# Patient Record
Sex: FEMALE | Race: OTHER | HISPANIC OR LATINO | URBAN - METROPOLITAN AREA
[De-identification: names, ages, dates, MRNs, and addresses within clinical notes are randomized per-mention and may not be internally consistent; named-entity substitution may affect disease eponyms.]

---

## 2024-05-05 ENCOUNTER — EMERGENCY (EMERGENCY)
Facility: HOSPITAL | Age: 44
LOS: 1 days | Discharge: HOME | End: 2024-05-05
Attending: EMERGENCY MEDICINE
Payer: COMMERCIAL

## 2024-05-05 VITALS
RESPIRATION RATE: 20 BRPM | DIASTOLIC BLOOD PRESSURE: 80 MMHG | OXYGEN SATURATION: 100 % | WEIGHT: 127.87 LBS | HEART RATE: 80 BPM | TEMPERATURE: 98 F | SYSTOLIC BLOOD PRESSURE: 138 MMHG

## 2024-05-05 PROCEDURE — 71046 X-RAY EXAM CHEST 2 VIEWS: CPT | Mod: 26

## 2024-05-05 PROCEDURE — 99284 EMERGENCY DEPT VISIT MOD MDM: CPT

## 2024-05-05 NOTE — ED ADULT TRIAGE NOTE - CHIEF COMPLAINT QUOTE
she had a fall 10 days ago in the bathroom, she has a pain in the right chest and in the middle of chest

## 2024-05-05 NOTE — ED ADULT NURSE NOTE - OBJECTIVE STATEMENT
Patient presents to ED with c/o mid chest pain with movement and SOB with deep breathing s/p fall. Patient reports she fell on 4/25/24, landed on the right side, denies LOC, not on blood thinner. Patient denies difficulty ambulating.

## 2024-05-06 VITALS
DIASTOLIC BLOOD PRESSURE: 71 MMHG | SYSTOLIC BLOOD PRESSURE: 109 MMHG | RESPIRATION RATE: 17 BRPM | OXYGEN SATURATION: 100 % | HEART RATE: 64 BPM | TEMPERATURE: 98 F

## 2024-05-06 LAB
ALBUMIN SERPL ELPH-MCNC: 3.8 G/DL — SIGNIFICANT CHANGE UP (ref 3.5–5)
ALP SERPL-CCNC: 77 U/L — SIGNIFICANT CHANGE UP (ref 40–120)
ALT FLD-CCNC: 19 U/L DA — SIGNIFICANT CHANGE UP (ref 10–60)
ANION GAP SERPL CALC-SCNC: 3 MMOL/L — LOW (ref 5–17)
AST SERPL-CCNC: 12 U/L — SIGNIFICANT CHANGE UP (ref 10–40)
BASOPHILS # BLD AUTO: 0.06 K/UL — SIGNIFICANT CHANGE UP (ref 0–0.2)
BASOPHILS NFR BLD AUTO: 0.6 % — SIGNIFICANT CHANGE UP (ref 0–2)
BILIRUB SERPL-MCNC: 0.4 MG/DL — SIGNIFICANT CHANGE UP (ref 0.2–1.2)
BUN SERPL-MCNC: 16 MG/DL — SIGNIFICANT CHANGE UP (ref 7–18)
CALCIUM SERPL-MCNC: 8.7 MG/DL — SIGNIFICANT CHANGE UP (ref 8.4–10.5)
CHLORIDE SERPL-SCNC: 109 MMOL/L — HIGH (ref 96–108)
CO2 SERPL-SCNC: 28 MMOL/L — SIGNIFICANT CHANGE UP (ref 22–31)
CREAT SERPL-MCNC: 0.64 MG/DL — SIGNIFICANT CHANGE UP (ref 0.5–1.3)
D DIMER BLD IA.RAPID-MCNC: <150 NG/ML DDU — SIGNIFICANT CHANGE UP
EGFR: 111 ML/MIN/1.73M2 — SIGNIFICANT CHANGE UP
EOSINOPHIL # BLD AUTO: 0.24 K/UL — SIGNIFICANT CHANGE UP (ref 0–0.5)
EOSINOPHIL NFR BLD AUTO: 2.6 % — SIGNIFICANT CHANGE UP (ref 0–6)
GLUCOSE SERPL-MCNC: 104 MG/DL — HIGH (ref 70–99)
HCG SERPL-ACNC: <1 MIU/ML — SIGNIFICANT CHANGE UP
HCT VFR BLD CALC: 36.4 % — SIGNIFICANT CHANGE UP (ref 34.5–45)
HGB BLD-MCNC: 12 G/DL — SIGNIFICANT CHANGE UP (ref 11.5–15.5)
IMM GRANULOCYTES NFR BLD AUTO: 0.3 % — SIGNIFICANT CHANGE UP (ref 0–0.9)
LYMPHOCYTES # BLD AUTO: 3.19 K/UL — SIGNIFICANT CHANGE UP (ref 1–3.3)
LYMPHOCYTES # BLD AUTO: 34.4 % — SIGNIFICANT CHANGE UP (ref 13–44)
MCHC RBC-ENTMCNC: 31.4 PG — SIGNIFICANT CHANGE UP (ref 27–34)
MCHC RBC-ENTMCNC: 33 GM/DL — SIGNIFICANT CHANGE UP (ref 32–36)
MCV RBC AUTO: 95.3 FL — SIGNIFICANT CHANGE UP (ref 80–100)
MONOCYTES # BLD AUTO: 0.67 K/UL — SIGNIFICANT CHANGE UP (ref 0–0.9)
MONOCYTES NFR BLD AUTO: 7.2 % — SIGNIFICANT CHANGE UP (ref 2–14)
NEUTROPHILS # BLD AUTO: 5.09 K/UL — SIGNIFICANT CHANGE UP (ref 1.8–7.4)
NEUTROPHILS NFR BLD AUTO: 54.9 % — SIGNIFICANT CHANGE UP (ref 43–77)
NRBC # BLD: 0 /100 WBCS — SIGNIFICANT CHANGE UP (ref 0–0)
PLATELET # BLD AUTO: 250 K/UL — SIGNIFICANT CHANGE UP (ref 150–400)
POTASSIUM SERPL-MCNC: 3.5 MMOL/L — SIGNIFICANT CHANGE UP (ref 3.5–5.3)
POTASSIUM SERPL-SCNC: 3.5 MMOL/L — SIGNIFICANT CHANGE UP (ref 3.5–5.3)
PROT SERPL-MCNC: 7.4 G/DL — SIGNIFICANT CHANGE UP (ref 6–8.3)
RBC # BLD: 3.82 M/UL — SIGNIFICANT CHANGE UP (ref 3.8–5.2)
RBC # FLD: 11.8 % — SIGNIFICANT CHANGE UP (ref 10.3–14.5)
SODIUM SERPL-SCNC: 140 MMOL/L — SIGNIFICANT CHANGE UP (ref 135–145)
TROPONIN I, HIGH SENSITIVITY RESULT: 22.2 NG/L — SIGNIFICANT CHANGE UP
WBC # BLD: 9.28 K/UL — SIGNIFICANT CHANGE UP (ref 3.8–10.5)
WBC # FLD AUTO: 9.28 K/UL — SIGNIFICANT CHANGE UP (ref 3.8–10.5)

## 2024-05-06 PROCEDURE — 96374 THER/PROPH/DIAG INJ IV PUSH: CPT

## 2024-05-06 PROCEDURE — 80053 COMPREHEN METABOLIC PANEL: CPT

## 2024-05-06 PROCEDURE — 84702 CHORIONIC GONADOTROPIN TEST: CPT

## 2024-05-06 PROCEDURE — 99284 EMERGENCY DEPT VISIT MOD MDM: CPT | Mod: 25

## 2024-05-06 PROCEDURE — 71250 CT THORAX DX C-: CPT | Mod: 26,MC

## 2024-05-06 PROCEDURE — 36415 COLL VENOUS BLD VENIPUNCTURE: CPT

## 2024-05-06 PROCEDURE — 85025 COMPLETE CBC W/AUTO DIFF WBC: CPT

## 2024-05-06 PROCEDURE — 85379 FIBRIN DEGRADATION QUANT: CPT

## 2024-05-06 PROCEDURE — 93005 ELECTROCARDIOGRAM TRACING: CPT

## 2024-05-06 PROCEDURE — 71250 CT THORAX DX C-: CPT | Mod: MC

## 2024-05-06 PROCEDURE — 84484 ASSAY OF TROPONIN QUANT: CPT

## 2024-05-06 PROCEDURE — 71046 X-RAY EXAM CHEST 2 VIEWS: CPT

## 2024-05-06 RX ORDER — IBUPROFEN 200 MG
1 TABLET ORAL
Qty: 30 | Refills: 0
Start: 2024-05-06 | End: 2024-05-15

## 2024-05-06 RX ORDER — KETOROLAC TROMETHAMINE 30 MG/ML
30 SYRINGE (ML) INJECTION ONCE
Refills: 0 | Status: DISCONTINUED | OUTPATIENT
Start: 2024-05-06 | End: 2024-05-06

## 2024-05-06 RX ORDER — ACETAMINOPHEN 500 MG
2 TABLET ORAL
Qty: 112 | Refills: 0
Start: 2024-05-06 | End: 2024-05-19

## 2024-05-06 RX ADMIN — Medication 30 MILLIGRAM(S): at 05:13

## 2024-05-06 NOTE — ED PROVIDER NOTE - CLINICAL SUMMARY MEDICAL DECISION MAKING FREE TEXT BOX
CT chest reported no traumatic injuries.  Pt is well appearing, has no new complaints and able to walk with normal gait. Pt is stable for discharge and follow up with medical doctor. Pt educated on care and need for follow up. Discussed anticipatory guidance and return precautions. Questions answered. I had a detailed discussion with the patient regarding the historical points, exam findings, and any diagnostic results supporting the discharge diagnosis.

## 2024-05-06 NOTE — ED PROVIDER NOTE - NSFOLLOWUPINSTRUCTIONS_ED_ALL_ED_FT
Take Motrin 400mg every 6 hrs for pain woith food.    Return to ER immediately if your chest pain returns, if you have pain with radiation to arms, back or neck, if you feel short of breath, feel weak, feel fast or pounding heart beating, if you have any fever or vomiting.  If you need assistance with follow up appointments our Care Coordinator can be reached at [phone]. In addition [facility] is located at [address] [place] [address], tel: [phone].

## 2024-05-06 NOTE — ED PROVIDER NOTE - OBJECTIVE STATEMENT
43-year-old female states 10 days ago she slipped in bathroom and struck right side of her chest wall on bathtub edge.  Patient with persistent tenderness to right side of her chest wall which radiates to the right side of her back.  No head trauma, no LOC, no abdominal pain, no nausea, no vomiting.  Patient is not on any anticoagulants or takes any prescription medications.

## 2024-05-06 NOTE — ED PROVIDER NOTE - PATIENT PORTAL LINK FT
You can access the FollowMyHealth Patient Portal offered by [facility] by registering at the following website: [email or web address]. By joining [employer]’s FollowMyHealth portal, you will also be able to view your health information using other applications (apps) compatible with our system.

## 2024-05-06 NOTE — ED PROVIDER NOTE - NSFOLLOWUPCLINICS_GEN_ALL_ED_FT
[facility]  Internal Medicine  [address] [place] [address]  Phone: [phone]  Fax: [phone]

## 2024-05-06 NOTE — ED PROVIDER NOTE - PHYSICAL EXAMINATION
No distress, patient with right anterior sternal area and right thoracic area tenderness to palpation, no bony deformities, no visible ecchymosis

## 2024-06-12 ENCOUNTER — EMERGENCY (EMERGENCY)
Facility: HOSPITAL | Age: 44
LOS: 1 days | Discharge: HOME | End: 2024-06-12
Attending: EMERGENCY MEDICINE
Payer: COMMERCIAL

## 2024-06-12 VITALS
WEIGHT: 138.89 LBS | RESPIRATION RATE: 16 BRPM | DIASTOLIC BLOOD PRESSURE: 80 MMHG | HEART RATE: 67 BPM | SYSTOLIC BLOOD PRESSURE: 150 MMHG | HEIGHT: 61 IN | OXYGEN SATURATION: 99 % | TEMPERATURE: 98 F

## 2024-06-12 LAB
APPEARANCE UR: CLEAR — SIGNIFICANT CHANGE UP
BILIRUB UR-MCNC: NEGATIVE — SIGNIFICANT CHANGE UP
COLOR SPEC: YELLOW — SIGNIFICANT CHANGE UP
DIFF PNL FLD: NEGATIVE — SIGNIFICANT CHANGE UP
GLUCOSE UR QL: NEGATIVE MG/DL — SIGNIFICANT CHANGE UP
HCG UR QL: NEGATIVE — SIGNIFICANT CHANGE UP
KETONES UR-MCNC: NEGATIVE MG/DL — SIGNIFICANT CHANGE UP
LEUKOCYTE ESTERASE UR-ACNC: NEGATIVE — SIGNIFICANT CHANGE UP
NITRITE UR-MCNC: NEGATIVE — SIGNIFICANT CHANGE UP
PH UR: 6.5 — SIGNIFICANT CHANGE UP (ref 5–8)
PROT UR-MCNC: NEGATIVE MG/DL — SIGNIFICANT CHANGE UP
SP GR SPEC: 1.01 — SIGNIFICANT CHANGE UP (ref 1–1.03)
UROBILINOGEN FLD QL: 0.2 MG/DL — SIGNIFICANT CHANGE UP (ref 0.2–1)

## 2024-06-12 PROCEDURE — 81025 URINE PREGNANCY TEST: CPT

## 2024-06-12 PROCEDURE — 99283 EMERGENCY DEPT VISIT LOW MDM: CPT

## 2024-06-12 PROCEDURE — 81003 URINALYSIS AUTO W/O SCOPE: CPT

## 2024-06-12 PROCEDURE — 99284 EMERGENCY DEPT VISIT MOD MDM: CPT

## 2024-06-12 RX ORDER — IBUPROFEN 200 MG
600 TABLET ORAL ONCE
Refills: 0 | Status: COMPLETED | OUTPATIENT
Start: 2024-06-12 | End: 2024-06-12

## 2024-06-12 RX ORDER — ONDANSETRON 8 MG/1
4 TABLET, FILM COATED ORAL ONCE
Refills: 0 | Status: COMPLETED | OUTPATIENT
Start: 2024-06-12 | End: 2024-06-12

## 2024-06-12 RX ORDER — PHENAZOPYRIDINE HCL 100 MG
200 TABLET ORAL ONCE
Refills: 0 | Status: COMPLETED | OUTPATIENT
Start: 2024-06-12 | End: 2024-06-12

## 2024-06-12 RX ORDER — FLUCONAZOLE 150 MG/1
150 TABLET ORAL ONCE
Refills: 0 | Status: COMPLETED | OUTPATIENT
Start: 2024-06-12 | End: 2024-06-12

## 2024-06-12 RX ADMIN — ONDANSETRON 4 MILLIGRAM(S): 8 TABLET, FILM COATED ORAL at 17:35

## 2024-06-12 RX ADMIN — FLUCONAZOLE 150 MILLIGRAM(S): 150 TABLET ORAL at 18:46

## 2024-06-12 RX ADMIN — Medication 200 MILLIGRAM(S): at 18:01

## 2024-06-12 RX ADMIN — Medication 600 MILLIGRAM(S): at 18:05

## 2024-06-12 RX ADMIN — Medication 600 MILLIGRAM(S): at 17:35

## 2024-06-12 NOTE — ED PROVIDER NOTE - NSFOLLOWUPINSTRUCTIONS_ED_ALL_ED_FT
Infección micótica vaginal en mujeres adultas  Vaginal Yeast Infection, Adult  Female body with a close-up showing the vagina with a yeast infection.  La infección micótica vaginal es camila afección que causa secreción vaginal y también dolor, hinchazón y enrojecimiento (inflamación) de la vagina. Esta es camila afección frecuente. Algunas mujeres contraen esta infección con frecuencia.    ¿Cuáles son las causas?  La causa de la infección es un cambio en el equilibrio normal de las levaduras (cándida) y las bacterias normales que viven en la vagina. Esta alteración deriva en el crecimiento excesivo de las levaduras, lo que causa la inflamación.    ¿Qué incrementa el riesgo?  Es más probable que esta afección ocurra en las mujeres que:  Tawnya antibióticos.  Tienen diabetes.  Tawnya anticonceptivos orales.  Están embarazadas.  Se hacen duchas vaginales con frecuencia.  Tienen debilitado el sistema de defensa del organismo (sistema inmunitario).  Haines estado tomando medicamentos con corticoesteroides valarie mucho tiempo.  Usan ropa ajustada con frecuencia.  ¿Cuáles son los signos o síntomas?  Los síntomas de esta afección incluyen:  Secreción vaginal jack, cremosa y espesa.  Hinchazón, picazón, enrojecimiento e irritación de la vagina. Los labios de la vagina (labios de la vulva) también pueden infectarse.  Dolor o ardor al orinar.  Dolor valarie las relaciones sexuales.  ¿Cómo se diagnostica?  Esta afección se diagnostica en función de lo siguiente:  Dulce antecedentes médicos.  Un examen físico.  Un examen pélvico. El médico examinará camila muestra de la secreción vaginal con un microscopio. Probablemente el médico envíe esta muestra al laboratorio para analizarla y confirmar el diagnóstico.  ¿Cómo se trata?  Esta afección se trata con medicamentos. Los medicamentos pueden ser recetados o de venta kary. Podrán indicarle que use chelly o más de lo siguiente:  Medicamentos que se tawnya por boca (orales).  Medicamentos que se aplican liana camila crema (tópicos).  Medicamentos que se colocan directamente en la vagina (óvulos vaginales).  Siga estas indicaciones en rosario casa:  Use o aplíquese los medicamentos de venta kary y los recetados solamente liana se lo haya indicado el médico.  No use tampones hasta que el médico la autorice.  No tenga sexo hasta que la infección haya desaparecido. El sexo puede prolongar o empeorar dulce síntomas de infección. Pregúntele al médico cuándo es seguro reanudar la actividad sexual.  Concurra a todas las visitas de seguimiento. Hominy es importante.  ¿Cómo se varghese?  A sign showing that a person should not douche.  No use ropa ajustada, liana pantimedias o pantalones ajustados.  Use ropa interior de algodón, que permite el paso del aire.  No use duchas vaginales, jabón perfumado, cremas ni talcos.  Cuando vaya al baño, siempre higienícese de adelante hacia atrás.  Si tiene diabetes, mantenga bajo control los niveles de azúcar en la racquel.  Pregúntele al médico otras maneras de prevenir las infecciones por levaduras.  Comuníquese con un médico si:  Tiene fiebre.  Los síntomas desaparecen y luego vuelven a aparecer.  Los síntomas no mejoran con el tratamiento.  Dulce síntomas empeoran.  Aparecen nuevos síntomas.  Aparecen ampollas alrededor o adentro de la vagina.  Le sale racquel de la vagina y no está menstruando.  Siente dolor en el abdomen.  Resumen  La infección micótica vaginal es camila afección que causa secreción y también dolor, hinchazón y enrojecimiento (inflamación) de la vagina.  Esta afección se trata con medicamentos. Los medicamentos pueden ser recetados o de venta kary.  Use o aplíquese los medicamentos de venta kary y los recetados solamente liana se lo haya indicado el médico.  No se juan francisco duchas vaginales. Reanude la actividad sexual u use tampones liana se lo haya indicado el médico.  Comuníquese con un médico si los síntomas no mejoran con el tratamiento o si los síntomas desaparecen y luego regresan.  Esta información no tiene liana fin reemplazar el consejo del médico. Asegúrese de hacerle al médico cualquier pregunta que tenga.    Document Revised: 04/05/2022 Document Reviewed: 04/05/2022  [employer] Patient Education © 2024 [employer]  [employer] logo  Terms and Conditions  Privacy Policy  Editorial Policy  All content on this site: Copyright © 2024 Else[employer], its licensors, and contributors. All rights are reserved, including those for text and data mining, AI training, and similar technologies. For all open access content, the Creative Commons licensing terms apply.  Cookies are used by this site. To decline or learn more, visit our Cookies page.

## 2024-06-12 NOTE — ED PROVIDER NOTE - CLINICAL SUMMARY MEDICAL DECISION MAKING FREE TEXT BOX
43-year-old female presents with dysuria, nausea, headache.  PE as above.    Urinalysis, symptomatic control, reassess

## 2024-06-12 NOTE — ED PROVIDER NOTE - PROGRESS NOTE DETAILS
Urinalysis negative.  On pelvic exam with thick white discharge.  Symptoms likely secondary to vaginitis which is likely yeast and related.  Given dose of fluconazole in the ED.  Will discharge.  Follow-up with OB/GYN.  Return precautions discussed.

## 2024-06-12 NOTE — ED PROVIDER NOTE - OBJECTIVE STATEMENT
43-year-old female denies past medical history presents with 3 days of dysuria and a foul-smelling urine, nausea, mild headache.  Denies flank pains, fevers, abdominal pains.  Denies other complaints at this time. 43-year-old female denies past medical history presents with 3 days of dysuria and a foul-smelling urine, nausea, mild headache.  Denies flank pains, fevers, abdominal pains.  Denies other complaints at this time.  Patient states symptoms started  after having intercourse when she felt some discomfort during intercourse.

## 2024-06-12 NOTE — ED PROVIDER NOTE - PATIENT PORTAL LINK FT
You can access the FollowMyHealth Patient Portal offered by [facility] by registering at the following website: [email or web address]. By joining [employer]’s FollowMyHealth portal, you will also be able to view your health information using other applications (apps) compatible with our system.

## 2024-06-13 PROBLEM — Z78.9 OTHER SPECIFIED HEALTH STATUS: Chronic | Status: ACTIVE | Noted: 2024-05-06
